# Patient Record
(demographics unavailable — no encounter records)

---

## 2024-10-15 NOTE — DATA REVIEWED
[de-identified] : Left wrist AP/lateral/oblique Xrays ordered, done and independently reviewed today 10/7/24: Well aligned healing distal radius fracture with interval healing noted.  Skeletally immature individual.

## 2024-10-15 NOTE — DATA REVIEWED
[de-identified] : Left wrist AP/lateral/oblique Xrays ordered, done and independently reviewed today 10/7/24: Well aligned healing distal radius fracture with interval healing noted.  Skeletally immature individual.

## 2024-10-15 NOTE — END OF VISIT
[FreeTextEntry3] : IAbebe MD, personally saw and evaluated the patient and developed the plan as documented above. I concur or have edited the note as appropriate.

## 2024-10-15 NOTE — PHYSICAL EXAM
[FreeTextEntry1] : Pleasant and cooperative with exam, appropriate for age. Ambulates without evidence of antalgia and limp, good coordination and balance. AAOX3  Skin: No rashes noted.  Eyes: Both conjunctiva, eyelids and pupils are present.  ENT:  Both ears, nose and lips are present. No nasal congestion.  Resp: No cough or wheezing noted.  Left wrist/forearm:  No bony deformities, inflammation, or erythema.  Minimal tenderness to palpation over the distal end of the radius.  Full range of motion with extension, flexion, ulnar and radial deviation with stiffness.  Fingers are warm, pink, and moving freely.  Radial pulse is +2 B/L. Brisk capillary refill in all 5 fingers.  Sensation is intact to light touch distally. Nerve innervation of the hand is intact. 5/5 motor strength with wrist flexion/extension. Able to perform a thumbs up maneuver (PIN), OK sign (AIN), finger crossover (ulnar).

## 2024-10-15 NOTE — REASON FOR VISIT
[Follow Up] : a follow up visit [Patient] : patient [Mother] : mother [FreeTextEntry1] : L distal radius fracture, sustained on 8/17/24

## 2024-10-15 NOTE — ASSESSMENT
[FreeTextEntry1] : Rosalia is a 9-year-old girl who sustained a left distal radius fracture on 8/17/2024.  Overall, she is doing well.  -We discussed ROSALIA's interval progress, physical exam, and all available radiographs at length during today's visit with patient and her parent/guardian who served as an independent historian due to child's age and unreliable nature of history. -Left wrist AP/lateral/oblique Xrays ordered, done and independently reviewed today 10/7/24: Well aligned healing distal radius fracture with interval healing noted.  Skeletally immature individual. -The etiology, pathoanatomy, treatment modalities, and expected natural history of the injury were discussed at length today. -Clinically, she is overall doing very well denies any pain about the wrist -At this time, she is allowed low-impact activities with brace only and should continue to work on range of motion while out of the brace.  No brace needed in the house or in the classroom. School note provided.  -Risks of reinjury discussed -She will f/u in 4 weeks for repeat clinical evaluation and XR left wrist.  Anticipate full activity clearance at that time.   All questions answered. Family and patient verbalize understanding of the plan.   IKellen PA-C have acted as scribe and documented the above for Dr. Wellington.

## 2024-10-15 NOTE — REVIEW OF SYSTEMS
[Change in Activity] : change in activity [Fever Above 102] : no fever [Malaise] : no malaise [Rash] : no rash [Eye Pain] : no eye pain [Redness] : no redness [Nasal Stuffiness] : no nasal congestion [Heart Problems] : no heart problems [Murmur] : no murmur [Congestion] : no congestion [Vomiting] : no vomiting [Diarrhea] : no diarrhea [Constipation] : no constipation [Kidney Infection] : denies kidney infection [Bladder Infection] : denies bladder infection [Limping] : no limping [Joint Pains] : no arthralgias [Joint Swelling] : no joint swelling [Sleep Disturbances] : ~T no sleep disturbances [Nl] : Hematologic/Lymphatic

## 2024-11-04 NOTE — ASSESSMENT
[FreeTextEntry1] : Rosalia is a 9-year-old girl who sustained a left distal radius fracture on 8/17/2024. Overall, she is doing well.  -We discussed ROSALIA's interval progress, physical exam, and all available radiographs at length during today's visit with patient and her parent/guardian who served as an independent historian due to child's age and unreliable nature of history. -Left wrist AP/lateral/oblique X-rays ordered, done and independently reviewed today 11/4/24: Well aligned healing distal radius fracture with interval healing noted.  Skeletally immature individual. -The etiology, patho anatomy, treatment modalities, and expected natural history of the injury were discussed at length today. -Clinically she is doing well without any discomfort or pain and has full ROM. She may resume all physical activities without any restrictions. School note was provided. -Risks of reinjury discussed -Follow up as needed.  All questions answered. Family and patient verbalize understanding of the plan.   I, Cherrie Isaacs, have acted as scribe and documented the above for Dr. Wellington.

## 2024-11-04 NOTE — REVIEW OF SYSTEMS
[Nl] : Hematologic/Lymphatic [Change in Activity] : no change in activity [Fever Above 102] : no fever [Malaise] : no malaise [Rash] : no rash [Eye Pain] : no eye pain [Redness] : no redness [Nasal Stuffiness] : no nasal congestion [Heart Problems] : no heart problems [Murmur] : no murmur [Congestion] : no congestion [Vomiting] : no vomiting [Diarrhea] : no diarrhea [Constipation] : no constipation [Kidney Infection] : denies kidney infection [Bladder Infection] : denies bladder infection [Limping] : no limping [Joint Pains] : no arthralgias [Joint Swelling] : no joint swelling [Sleep Disturbances] : ~T no sleep disturbances

## 2024-11-04 NOTE — DATA REVIEWED
[de-identified] : Left wrist AP/lateral/oblique X-rays ordered, done and independently reviewed today 11/4/24: Well aligned healing distal radius fracture with interval healing noted.  Skeletally immature individual.

## 2024-11-04 NOTE — REASON FOR VISIT
[Follow Up] : a follow up visit [Patient] : patient [Mother] : mother [FreeTextEntry1] : L distal radius fracture, sustained on 8/17/24.

## 2024-11-04 NOTE — PHYSICAL EXAM
[FreeTextEntry1] : Gait: Presents ambulating independently without signs of antalgia. Good coordination and balance noted. GENERAL: alert, cooperative, in NAD SKIN: The skin is intact, warm, pink and dry over the area examined. EYES: Normal conjunctiva, normal eyelids and pupils were equal and round. ENT: normal ears, normal nose and normal lips. CARDIOVASCULAR: brisk capillary refill, but no peripheral edema. RESPIRATORY: The patient is in no apparent respiratory distress. They're taking full deep breaths without use of accessory muscles or evidence of audible wheezes or stridor without the use of a stethoscope. Normal respiratory effort. ABDOMEN: not examined   Left wrist/forearm:  No bony deformities, inflammation, or erythema.  No tenderness to palpation over the distal end of the radius.  Full range of motion with extension, flexion, ulnar and radial deviation.  Fingers are warm, pink, and moving freely.  Radial pulse is +2 B/L. Brisk capillary refill in all 5 fingers.  Sensation is intact to light touch distally. Nerve innervation of the hand is intact. 5/5 motor strength with wrist flexion/extension. Able to perform a thumbs up maneuver (PIN), OK sign (AIN), finger crossover (ulnar).

## 2024-11-04 NOTE — HISTORY OF PRESENT ILLNESS
[FreeTextEntry1] : Rosalia is a 9-year-old F who presents with her mother for follow up of left wrist injury sustained on 8/17/24. She fell from a scooter and had pain about the wrist. She presented to urgent care for evaluation, where XRs were performed, showing a distal radius fracture. She was placed into a splint. At initial visit, she was placed in a SAC which was removed on 9/16/24 and she was transitioned to a wrist immobilizer.    She was last seen on 10/07/2024. Today mother reports that she is doing well. Denies any discomfort or pain. Denies any radiating pain or tingling sensation. She is not taking any pain medication.  Here for further orthopedic evaluation.